# Patient Record
(demographics unavailable — no encounter records)

---

## 2025-02-20 NOTE — HISTORY OF PRESENT ILLNESS
[Postpartum Follow Up] : postpartum follow up [Complications:___] : no complications [Last Pap Date: ___] : Last Pap Date: [unfilled] [] : delivered by vaginal delivery [Female] : Delivery History: baby girl [Wt. ___] : weighing [unfilled] [Breastfeeding] : not currently nursing [Resumed Menses] : has not resumed her menses [Resumed Dowell] : has not resumed intercourse [Intended Contraception] : Intended Contraception: [Consistent Condom Use] : consistent condom use [S/Sx PP Depression] : no signs/symptoms of postpartum depression [Back to Normal] : is back to normal in size [Normal] : the vagina was normal [Not Done] : Examination of breasts not done [Doing Well] : is doing well [No Sign of Infection] : is showing no signs of infection [None] : None [de-identified] : Periurethral laceration [de-identified] : Follow-up in 1 year or as needed

## 2025-03-17 NOTE — HISTORY OF PRESENT ILLNESS
[FreeTextEntry1] : 31 y/o S/P  25 with periurethral tear presents for follow up after episode of painful intercourse that resulted in pelvic pain and cramping as well as burning with urination. No abnormal vaginal bleeding. Would also like to initiate oral contraceptives. Had one normal menstrual period and is not nursing infant.

## 2025-03-17 NOTE — HISTORY OF PRESENT ILLNESS
[FreeTextEntry1] : 29 y/o S/P  25 with periurethral tear presents for follow up after episode of painful intercourse that resulted in pelvic pain and cramping as well as burning with urination. No abnormal vaginal bleeding. Would also like to initiate oral contraceptives. Had one normal menstrual period and is not nursing infant.

## 2025-03-17 NOTE — PLAN
[FreeTextEntry1] : -Correct pill use discussed. Begin with first day of next menstrual period.  -Side effects and danger signs reviewed.  -Efficacy in pregnancy protection discussed  -Pill does not protect against STD transmission.  -Potential interaction with medications discussed.  -All questions answered.